# Patient Record
Sex: FEMALE | URBAN - METROPOLITAN AREA
[De-identification: names, ages, dates, MRNs, and addresses within clinical notes are randomized per-mention and may not be internally consistent; named-entity substitution may affect disease eponyms.]

---

## 2020-11-18 ENCOUNTER — OFFICE VISIT (OUTPATIENT)
Dept: URBAN - METROPOLITAN AREA CLINIC 41 | Facility: CLINIC | Age: 55
End: 2020-11-18
Payer: MEDICARE

## 2020-11-18 DIAGNOSIS — H35.349 MACULAR PSEUDOHOLE: Primary | ICD-10-CM

## 2020-11-18 DIAGNOSIS — H25.13 AGE-RELATED NUCLEAR CATARACT, BILATERAL: ICD-10-CM

## 2020-11-18 PROCEDURE — 92004 COMPRE OPH EXAM NEW PT 1/>: CPT | Performed by: OPHTHALMOLOGY

## 2020-11-18 PROCEDURE — 92134 CPTRZ OPH DX IMG PST SGM RTA: CPT | Performed by: OPHTHALMOLOGY

## 2020-11-18 ASSESSMENT — INTRAOCULAR PRESSURE
OD: 14
OS: 17

## 2020-11-18 NOTE — IMPRESSION/PLAN
Impression: Age-related nuclear cataract, bilateral: H25.13.  Plan: --early Sydnee ESPINOZA, observe

## 2020-11-18 NOTE — IMPRESSION/PLAN
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 Left. Plan: --exam/OCT confirm subfoveal outer retinal defect OS
--findings d/w patient
--h/o mult head trauma, concern for traumatic maculopathy
--denies h/o sungazing, welding, laser pointers
--rec observation, no treatment currently available
--monitor [place] grid and notify us of any changes RTC PRN